# Patient Record
Sex: FEMALE | Race: WHITE | NOT HISPANIC OR LATINO | Employment: FULL TIME | URBAN - METROPOLITAN AREA
[De-identification: names, ages, dates, MRNs, and addresses within clinical notes are randomized per-mention and may not be internally consistent; named-entity substitution may affect disease eponyms.]

---

## 2017-11-21 ENCOUNTER — OFFICE VISIT (OUTPATIENT)
Dept: ORTHOPEDIC SURGERY | Facility: CLINIC | Age: 57
End: 2017-11-21

## 2017-11-21 VITALS
HEIGHT: 63 IN | WEIGHT: 134 LBS | SYSTOLIC BLOOD PRESSURE: 136 MMHG | DIASTOLIC BLOOD PRESSURE: 81 MMHG | HEART RATE: 72 BPM | BODY MASS INDEX: 23.74 KG/M2

## 2017-11-21 DIAGNOSIS — Z96.653 STATUS POST TOTAL BILATERAL KNEE REPLACEMENT: Primary | ICD-10-CM

## 2017-11-21 PROCEDURE — 99204 OFFICE O/P NEW MOD 45 MIN: CPT | Performed by: ORTHOPAEDIC SURGERY

## 2017-11-21 NOTE — PROGRESS NOTES
Orthopaedic Clinic Note: Knee New Patient    Chief Complaint   Patient presents with   • Right Knee - Establish Care     (R) TKA 02/2013- Dr Prater   • Left Knee - Establish Care     (L) TKA 10/2013- Dr Prater        YANY Ryan is a 56 y.o. female who presents To [facility] care.  She is status post bilateral total knee arthroplasties performed in 2013.  She is doing well and denies any interval complaints.  She rates her pain 0/10 on the pain scale.  She is able to perform all daily activities without any significant limitations.  She is ambulating with no assistive device today.  He is here simply for a checkup to ensure there were no complications associated with her knee arthroplasty.      Past Medical History:   Diagnosis Date   • Osteoarthritis       Past Surgical History:   Procedure Laterality Date   • APPENDECTOMY     • BREAST LUMPECTOMY Right    • HYSTERECTOMY     • JOINT REPLACEMENT Bilateral     TKA   • NASAL SEPTUM SURGERY     • WRIST SURGERY Left       Family History   Problem Relation Age of Onset   • Cancer Mother    • Osteoarthritis Mother    • Hypertension Mother    • Diabetes Mother    • Stroke Father    • Osteoarthritis Father    • Hypertension Father      Social History     Social History   • Marital status: Unknown     Spouse name: N/A   • Number of children: N/A   • Years of education: N/A     Occupational History   • Not on file.     Social History Main Topics   • Smoking status: Never Smoker   • Smokeless tobacco: Never Used   • Alcohol use No   • Drug use: No   • Sexual activity: Defer     Other Topics Concern   • Not on file     Social History Narrative   • No narrative on file      No current outpatient prescriptions on file prior to visit.     No current facility-administered medications on file prior to visit.       Allergies   Allergen Reactions   • Penicillins    • Sulfa Antibiotics         Review of Systems   Constitutional: Negative.    HENT: Positive for sinus pressure and  "sneezing.    Eyes: Negative.    Respiratory: Negative.    Cardiovascular: Negative.    Gastrointestinal: Negative.    Endocrine: Negative.    Genitourinary: Negative.    Musculoskeletal: Positive for arthralgias and back pain.   Skin: Negative.    Allergic/Immunologic: Positive for environmental allergies.   Neurological: Negative.    Hematological: Negative.    Psychiatric/Behavioral: Negative.         The following portions of the patient's history were reviewed and updated as appropriate: allergies, current medications, past family history, past medical history, past social history, past surgical history and problem list.    Physical Exam  Blood pressure 136/81, pulse 72, height 63\" (160 cm), weight 134 lb (60.8 kg).    Body mass index is 23.74 kg/(m^2).    GENERAL APPEARANCE: awake, alert & oriented x 3, in no acute distress and well developed, well nourished  PSYCH: normal affect  LUNGS:  breathing nonlabored  EYES: sclera anicteric  CARDIOVASCULAR: palpable dorsalis pedis, palpable posterior tibial bilaterally. Capillary refill less than 2 seconds  EXTREMITIES: no clubbing, cyanosis  GAIT:  Normal            Right Lower Extremity Exam:   ----------  Hip Exam  ----------  FLEXION CONTRACTURE: None  FLEXION: 110 degrees  INTERNAL ROTATION: 20 degrees at 90 degrees of flexion   EXTERNAL ROTATION: 40 degrees at 90 degrees of flexion    PAIN WITH HIP MOTION: no  ----------  Knee Exam  ----------  ALIGNMENT: neutral, no varus or valgus deformity     RANGE OF MOTION:  Normal (0-130 degrees) with no extensor lag or flexion contracture  LIGAMENTOUS STABILITY:   stable to varus and valgus stress at 0 and 30 degrees without any evidence of laxity     STRENGTH:  5/5 knee flexion, extension. 5/5 ankle dorsiflexion and plantarflexion.     PAIN WITH PALPATION: denies tenderness to palpation about the knee, denies medial or lateral joint line pain  KNEE EFFUSION: no  PAIN WITH KNEE ROM: no  PATELLAR CREPITUS: no  SPECIAL EXAM " FINDINGS:  Negative patellar compression    REFLEXES:  PATELLAR 2+/4  ACHILLES 2+/4    CLONUS: negative  STRAIGHT LEG TEST:   negative    SENSATION TO LIGHT TOUCH:  DEEP PERONEAL/SUPERFICIAL PERONEAL/SURAL/SAPHENOUS/TIBIAL:   intact    EDEMA:  no  ERYTHEMA:  no  WOUNDS/INCISIONS: Well-healed anterior midline knee incision, no overlying skin problems.      Left Lower Extremity Exam:   ----------  Hip Exam  ----------  FLEXION CONTRACTURE: None  FLEXION: 110 degrees  INTERNAL ROTATION: 20 degrees at 90 degrees of flexion   EXTERNAL ROTATION: 40 degrees at 90 degrees of flexion    PAIN WITH HIP MOTION: no  ----------  Knee Exam  ----------  ALIGNMENT: neutral, no varus or valgus deformity     RANGE OF MOTION:  Normal (0-130 degrees) with no extensor lag or flexion contracture  LIGAMENTOUS STABILITY:   stable to varus and valgus stress at 0 and 30 degrees without any evidence of laxity     STRENGTH:  5/5 knee flexion, extension. 5/5 ankle dorsiflexion and plantarflexion.     PAIN WITH PALPATION: denies tenderness to palpation about the knee, denies medial or lateral joint line pain  KNEE EFFUSION: no  PAIN WITH KNEE ROM: no  PATELLAR CREPITUS: no  SPECIAL EXAM FINDINGS:  Negative patellar compression    REFLEXES:  PATELLAR 2+/4  ACHILLES 2+/4    CLONUS: negative  STRAIGHT LEG TEST:   negative    SENSATION TO LIGHT TOUCH:  DEEP PERONEAL/SUPERFICIAL PERONEAL/SURAL/SAPHENOUS/TIBIAL:   intact    EDEMA:  no  ERYTHEMA:  no  WOUNDS/INCISIONS: Well-healed anterior midline incision, no overlying skin problems.    ______________________________________________________________________  ______________________________________________________________________    RADIOGRAPHIC FINDINGS:   Indication: Status post bilateral total knee arthroplasties    Comparison: Todays xrays were compared to previous xrays from 11/25/14     Right knee(s) 2 views: Demonstrate well positioned knee arthroplasty components in satisfactory alignment without  evidence of wear, loosening, subsidence, fracture, or osteolysis and No significant changes compared to prior radiographs.  Left knee(s) 2 views: Demonstrate well positioned knee arthroplasty components in satisfactory alignment without evidence of wear, loosening, subsidence, fracture, or osteolysis and No significant changes compared to prior radiographs.    Assessment/Plan:   Diagnosis Plan   1. Status post total bilateral knee replacement  XR Knee 1 or 2 View Bilateral     Patient is doing well 4+ year status post bilateral total knee arthroplasties.  I recommended continued activity as tolerated without restrictions.  I'll see her back in 2 years with repeat x-ray AP and lateral views bilateral knees.    ASHLEY Rosen(R)  11/21/17  10:15 AM

## 2019-09-24 ENCOUNTER — APPOINTMENT (OUTPATIENT)
Dept: WOMENS IMAGING | Facility: HOSPITAL | Age: 59
End: 2019-09-24

## 2019-09-24 PROCEDURE — 77067 SCR MAMMO BI INCL CAD: CPT | Performed by: RADIOLOGY

## 2020-07-16 ENCOUNTER — OFFICE VISIT (OUTPATIENT)
Dept: ORTHOPEDIC SURGERY | Facility: CLINIC | Age: 60
End: 2020-07-16

## 2020-07-16 VITALS — OXYGEN SATURATION: 99 % | HEART RATE: 84 BPM | BODY MASS INDEX: 23.75 KG/M2 | HEIGHT: 63 IN | WEIGHT: 134.04 LBS

## 2020-07-16 DIAGNOSIS — Z96.652 S/P TKR (TOTAL KNEE REPLACEMENT), LEFT: Primary | ICD-10-CM

## 2020-07-16 DIAGNOSIS — Z96.651 S/P TKR (TOTAL KNEE REPLACEMENT), RIGHT: ICD-10-CM

## 2020-07-16 PROCEDURE — 99213 OFFICE O/P EST LOW 20 MIN: CPT | Performed by: ORTHOPAEDIC SURGERY

## 2020-07-16 NOTE — PROGRESS NOTES
Orthopaedic Clinic Note: Knee Established Patient    Chief Complaint   Patient presents with   • Follow-up     2.5 years- s/p (R) TKA 02/2013, (L) TKA 10/2013        HPI    It has been 2.5  year(s) since Ms. Ryan's last visit. She returns to clinic today for follow-up bilateral total knee arthroplasties.  She is approximately 7 years out from surgery of both knees. She rates her pain a 1/10 on the pain scale and is currently taking nothing for pain. She is ambulating with no assistive device. She has completed therapy.  She denies fevers, chills, or constitutional symptoms.  Overall, she is doing well.  She denies complications.    Past Medical History:   Diagnosis Date   • Osteoarthritis       Past Surgical History:   Procedure Laterality Date   • APPENDECTOMY     • BREAST LUMPECTOMY Right    • HYSTERECTOMY     • JOINT REPLACEMENT Bilateral     TKA   • NASAL SEPTUM SURGERY     • WRIST SURGERY Left       Family History   Problem Relation Age of Onset   • Cancer Mother    • Osteoarthritis Mother    • Hypertension Mother    • Diabetes Mother    • Stroke Father    • Osteoarthritis Father    • Hypertension Father      Social History     Socioeconomic History   • Marital status: Unknown     Spouse name: Not on file   • Number of children: Not on file   • Years of education: Not on file   • Highest education level: Not on file   Tobacco Use   • Smoking status: Never Smoker   • Smokeless tobacco: Never Used   Substance and Sexual Activity   • Alcohol use: No   • Drug use: No   • Sexual activity: Defer      Current Outpatient Medications on File Prior to Visit   Medication Sig Dispense Refill   • Loratadine (CLARITIN PO) Take  by mouth.     • MULTIPLE VITAMIN PO Take  by mouth.       No current facility-administered medications on file prior to visit.       Allergies   Allergen Reactions   • Penicillins    • Sulfa Antibiotics         Review of Systems   Constitutional: Negative.    HENT: Negative.    Eyes: Negative.   "  Respiratory: Negative.    Cardiovascular: Negative.    Gastrointestinal: Negative.    Endocrine: Negative.    Genitourinary: Negative.    Musculoskeletal: Positive for arthralgias.   Skin: Negative.    Allergic/Immunologic: Negative.    Neurological: Negative.    Hematological: Negative.    Psychiatric/Behavioral: Negative.         The patient's Review of Systems was personally reviewed and confirmed as accurate.    Physical Exam  Pulse 84, height 160 cm (62.99\"), weight 60.8 kg (134 lb 0.6 oz), SpO2 99 %.    Body mass index is 23.75 kg/m².    GENERAL APPEARANCE: awake, alert, oriented, in no acute distress and well developed, well nourished  LUNGS:  breathing nonlabored  EXTREMITIES: no clubbing, cyanosis  PERIPHERAL PULSES: palpable dorsalis pedis and posterior tibial pulses bilaterally.    GAIT:  Normal        ----------  Bilateral Knee Exam:  ----------  ALIGNMENT: neutral  ----------  RANGE OF MOTION:  Normal (0-120 degrees) with no extensor lag or flexion contracture  LIGAMENTOUS STABILITY:   stable to varus and valgus stress at terminal extension and 30 degrees without any evidence of laxity  ----------  STRENGTH:  KNEE FLEXION 5/5  KNEE EXTENSION  5/5  ANKLE DORSIFLEXION  5/5  ANKLE PLANTARFLEXION  5/5  ----------  PAIN WITH PALPATION:denies tenderness to palpation about the knee  KNEE EFFUSION: no  PAIN WITH KNEE ROM: no  PATELLAR CREPITUS:  no  ----------  SENSATION TO LIGHT TOUCH:  DEEP PERONEAL/SUPERFICIAL PERONEAL/SURAL/SAPHENOUS/TIBIAL:    intact  ----------  EDEMA:  no  ERYTHEMA:    no  WOUNDS/INCISIONS:   yes, well healed surgical incision without evidence of erythema or drainage  _____________________________________________________________________  _____________________________________________________________________    RADIOGRAPHIC FINDINGS:   Indication: Status post bilateral total knee arthroplasty    Comparison: Todays xrays were compared to previous xrays from 11/21/2017    Knee films: " Demonstrate well positioned knee arthroplasty components in satisfactory alignment without evidence of wear, loosening, subsidence, fracture, or osteolysis and No significant changes compared to prior radiographs.    Assessment/Plan:   Diagnosis Plan   1. S/P TKR (total knee replacement), left  XR Knee 3 View Bilateral   2. S/P TKR (total knee replacement), right  XR Knee 3 View Bilateral     Patient is doing well status post bilateral total knee arthroplasties.  Her occasional discomfort is due to seasonal changes with occasional aching in the knees.  She states it is not limiting daily activities.  I recommended over-the-counter anti-inflammatories as needed.  I will see her back in 5 years for repeat evaluation of both knees with x-ray 3 views both knees on return.  She is welcome to follow-up sooner should problems arise.      Roshni Peña, [facility]  07/16/20  09:35

## 2022-04-14 ENCOUNTER — OFFICE VISIT (OUTPATIENT)
Dept: ORTHOPEDIC SURGERY | Facility: CLINIC | Age: 62
End: 2022-04-14

## 2022-04-14 VITALS
BODY MASS INDEX: 23.57 KG/M2 | WEIGHT: 133 LBS | DIASTOLIC BLOOD PRESSURE: 82 MMHG | HEIGHT: 63 IN | SYSTOLIC BLOOD PRESSURE: 128 MMHG

## 2022-04-14 DIAGNOSIS — Z96.652 STATUS POST TOTAL LEFT KNEE REPLACEMENT: ICD-10-CM

## 2022-04-14 DIAGNOSIS — M25.562 LEFT KNEE PAIN, UNSPECIFIED CHRONICITY: ICD-10-CM

## 2022-04-14 DIAGNOSIS — S86.912A KNEE STRAIN, LEFT, INITIAL ENCOUNTER: Primary | ICD-10-CM

## 2022-04-14 PROCEDURE — 99213 OFFICE O/P EST LOW 20 MIN: CPT | Performed by: ORTHOPAEDIC SURGERY

## 2022-04-14 RX ORDER — CETIRIZINE HYDROCHLORIDE 10 MG/1
10 TABLET ORAL DAILY
COMMUNITY

## 2022-04-14 NOTE — PROGRESS NOTES
Orthopaedic Clinic Note: Knee Established Patient    Chief Complaint   Patient presents with   • Left Knee - Pain     Fall DOI: 4/9/22        HPI    Annemarie Ryan is a 61 y.o. female who presents with new problem of: left knee pain.  Onset: mechanical fall.  She is status post total knee arthroplasty by Dr. Prater in 2013.  The issue has been ongoing for 6 day(s). Pain is a 3/10 on the pain scale. Pain is described as aching. Associated symptoms include pain, swelling and stiffness. The pain is worse with walking, standing, climbing stairs and rising from seated position; elevating the extremity improve the pain. Previous treatments have included: nothing.    I have reviewed the following portions of the patient's history:History of Present Illness    Past Medical History:   Diagnosis Date   • Osteoarthritis       Past Surgical History:   Procedure Laterality Date   • APPENDECTOMY     • BREAST LUMPECTOMY Right    • HYSTERECTOMY     • JOINT REPLACEMENT Bilateral     TKA   • NASAL SEPTUM SURGERY     • WRIST SURGERY Left       Family History   Problem Relation Age of Onset   • Cancer Mother    • Osteoarthritis Mother    • Hypertension Mother    • Diabetes Mother    • Stroke Father    • Osteoarthritis Father    • Hypertension Father      Social History     Socioeconomic History   • Marital status: Unknown   Tobacco Use   • Smoking status: Never Smoker   • Smokeless tobacco: Never Used   Substance and Sexual Activity   • Alcohol use: No   • Drug use: No   • Sexual activity: Defer      Current Outpatient Medications on File Prior to Visit   Medication Sig Dispense Refill   • CALCIUM PO Take  by mouth.     • cetirizine (zyrTEC) 10 MG tablet Take 10 mg by mouth Daily.     • MULTIPLE VITAMIN PO Take  by mouth.     • [DISCONTINUED] Loratadine (CLARITIN PO) Take  by mouth.       No current facility-administered medications on file prior to visit.      Allergies   Allergen Reactions   • Penicillins    • Sulfa Antibiotics      "    Review of Systems   Constitutional: Positive for activity change.   HENT: Negative.    Eyes: Negative.    Respiratory: Negative.    Cardiovascular: Negative.    Gastrointestinal: Negative.    Endocrine: Negative.    Genitourinary: Negative.    Musculoskeletal: Positive for arthralgias, gait problem and joint swelling.   Skin: Negative.    Allergic/Immunologic: Positive for environmental allergies.   Hematological: Negative.    Psychiatric/Behavioral: Negative.         The patient's Review of Systems was personally reviewed and confirmed as accurate.    Physical Exam  Blood pressure 128/82, height 160 cm (62.99\"), weight 60.3 kg (133 lb).    Body mass index is 23.57 kg/m².    GENERAL APPEARANCE: awake, alert, oriented, in no acute distress and well developed, well nourished  LUNGS:  breathing nonlabored  EXTREMITIES: no clubbing, cyanosis  PERIPHERAL PULSES: palpable dorsalis pedis and posterior tibial pulses bilaterally.    GAIT:  Normal        ----------  Left Knee Exam:  ----------  ALIGNMENT: neutral  ----------  RANGE OF MOTION:  Normal (0-120 degrees) with no extensor lag or flexion contracture  LIGAMENTOUS STABILITY:   stable to varus and valgus stress at terminal extension and 30 degrees without any evidence of laxity  ----------  STRENGTH:  KNEE FLEXION 5/5  KNEE EXTENSION  5/5  ANKLE DORSIFLEXION  5/5  ANKLE PLANTARFLEXION  5/5  ----------  PAIN WITH PALPATION:medial joint line and anterior knee  KNEE EFFUSION: yes, trace effusion  PAIN WITH KNEE ROM: no  PATELLAR CREPITUS:  no  ----------  SENSATION TO LIGHT TOUCH:  DEEP PERONEAL/SUPERFICIAL PERONEAL/SURAL/SAPHENOUS/TIBIAL:    intact  ----------  EDEMA:  no  ERYTHEMA:    no  WOUNDS/INCISIONS:   no  _____________________________________________________________________  _____________________________________________________________________    RADIOGRAPHIC FINDINGS:   Indication: Left knee pain    Comparison: Todays xrays were compared to previous xrays " from 7/16/2020    Knee films: Demonstrate well positioned knee arthroplasty components in satisfactory alignment without evidence of wear, loosening, subsidence, fracture, or osteolysis and No significant changes compared to prior radiographs.    Assessment/Plan:   Diagnosis Plan   1. Knee strain, left, initial encounter     2. Left knee pain, unspecified chronicity  XR Knee 3+ View With [place]christ Left   3. Status post total left knee replacement       I discussed with this patient I see no clinical or radiographic evidence of complication from her knee replacement.  I believe the fall resulted in soft tissue strain with no acute bony injury or mechanical complications with a knee replacement.  I recommended conservative treatment with over-the-counter anti-inflammatories, ice, elevation.  She is agreeable to this plan.  If her symptoms fail to improve within the next couple of weeks, encouraged her to call the office for follow-up.  Otherwise she can follow-up as needed.      Edenilson Culp MD  04/14/22  11:09 EDT

## 2023-09-21 ENCOUNTER — TRANSCRIBE ORDERS (OUTPATIENT)
Dept: ADMINISTRATIVE | Facility: HOSPITAL | Age: 63
End: 2023-09-21
Payer: COMMERCIAL

## 2023-09-21 DIAGNOSIS — E78.5 HYPERLIPIDEMIA, UNSPECIFIED HYPERLIPIDEMIA TYPE: Primary | ICD-10-CM

## 2023-09-29 ENCOUNTER — HOSPITAL ENCOUNTER (OUTPATIENT)
Dept: CT IMAGING | Facility: HOSPITAL | Age: 63
Discharge: HOME | End: 2023-09-29
Admitting: INTERNAL MEDICINE

## 2023-09-29 DIAGNOSIS — E78.5 HYPERLIPIDEMIA, UNSPECIFIED HYPERLIPIDEMIA TYPE: ICD-10-CM

## 2023-09-29 PROCEDURE — 75571 CT HRT W/O DYE W/CA TEST: CPT

## 2025-06-24 ENCOUNTER — OFFICE VISIT (OUTPATIENT)
Dept: ORTHOPEDIC SURGERY | Facility: CLINIC | Age: 65
End: 2025-06-24
Payer: COMMERCIAL

## 2025-06-24 VITALS
BODY MASS INDEX: 22.64 KG/M2 | HEIGHT: 63 IN | DIASTOLIC BLOOD PRESSURE: 70 MMHG | SYSTOLIC BLOOD PRESSURE: 126 MMHG | WEIGHT: 127.8 LBS

## 2025-06-24 DIAGNOSIS — M17.10 PATELLOFEMORAL ARTHRITIS: Primary | ICD-10-CM

## 2025-06-24 DIAGNOSIS — Z96.653 HISTORY OF BILATERAL KNEE ARTHROPLASTY: ICD-10-CM

## 2025-06-24 PROCEDURE — 99204 OFFICE O/P NEW MOD 45 MIN: CPT | Performed by: ORTHOPAEDIC SURGERY

## 2025-06-24 RX ORDER — MELOXICAM 15 MG/1
TABLET ORAL
Qty: 90 TABLET | Refills: 1 | Status: SHIPPED | OUTPATIENT
Start: 2025-06-24

## 2025-06-24 NOTE — PROGRESS NOTES
Orthopaedic Clinic Note: Knee New Patient    Chief Complaint   Patient presents with    Right Knee - Pain     Right knee arthroplasty 2/2013    Left Knee - Pain     Left knee arthroplasty 10/2013        YANY Ryan is a 64 y.o. female who presents with bilateral knee pain for 1 year on the right and 1 month for the left. Onset atraumatic and gradual in nature. Pain is localized to the anterior knee and is a 4/10 on the pain scale. Pain is described as dull and aching. Associated symptoms include pain, popping, stiffness, and tingling. The pain is worse with climbing stairs, leisure, and uneven ground; resting make it better. Previous treatments have included: nothing .. Although some transient relief was reported with these interventions, these conservative measures have failed and symptoms have persisted. The patient is limited in daily activities and has had a significant decrease in quality of life as a result. She denies fevers, chills, or constitutional symptoms.    I have reviewed the following portions of the patient's history:History of Present Illness    Past Medical History:   Diagnosis Date    Arthritis of back     CTS (carpal tunnel syndrome)     Fracture of wrist     Left    Knee swelling     Low back strain     Neck strain     Osteoarthritis     Tear of meniscus of knee       Past Surgical History:   Procedure Laterality Date    APPENDECTOMY      BREAST LUMPECTOMY Right     HYSTERECTOMY      JOINT REPLACEMENT Bilateral     TKA    NASAL SEPTUM SURGERY      WRIST SURGERY Left       Family History   Problem Relation Age of Onset    Cancer Mother     Osteoarthritis Mother     Hypertension Mother     Diabetes Mother     Broken bones Mother     Osteoporosis Mother     Stroke Father     Osteoarthritis Father     Hypertension Father      Social History     Socioeconomic History    Marital status: Unknown   Tobacco Use    Smoking status: Never    Smokeless tobacco: Never   Substance and Sexual Activity     Unfortunately, I do not have any availability and already have over my max of new pt's scheduled for tomorrow. I will keep this note in my basket in case any of my pt's cancel. "Alcohol use: No    Drug use: No    Sexual activity: Yes     Partners: Male     Birth control/protection: Hysterectomy      Current Outpatient Medications on File Prior to Visit   Medication Sig Dispense Refill    CALCIUM PO Take  by mouth.      cetirizine (zyrTEC) 10 MG tablet Take 1 tablet by mouth Daily.      MULTIPLE VITAMIN PO Take  by mouth.       No current facility-administered medications on file prior to visit.      Allergies   Allergen Reactions    Penicillins     Sulfa Antibiotics         Review of Systems   Constitutional: Negative.    HENT: Negative.     Eyes: Negative.    Respiratory: Negative.     Cardiovascular: Negative.    Gastrointestinal: Negative.    Endocrine: Negative.    Genitourinary: Negative.    Musculoskeletal:  Positive for arthralgias.   Skin: Negative.    Allergic/Immunologic: Negative.    Neurological: Negative.    Hematological: Negative.    Psychiatric/Behavioral: Negative.          The patient's Review of Systems was personally reviewed and confirmed as accurate.    The following portions of the patient's history were reviewed and updated as appropriate: allergies, current medications, past family history, past medical history, past social history, past surgical history, and problem list.    Physical Exam  Blood pressure 126/70, height 160 cm (63\"), weight 58 kg (127 lb 12.8 oz).    Body mass index is 22.64 kg/m².    GENERAL APPEARANCE: awake, alert & oriented x 3, in no acute distress and well developed, well nourished  PSYCH: normal affect  LUNGS:  breathing nonlabored  EYES: sclera anicteric  CARDIOVASCULAR: palpable dorsalis pedis, palpable posterior tibial bilaterally. Capillary refill less than 2 seconds  EXTREMITIES: no clubbing, cyanosis  GAIT:  Normal            Bilateral lower Extremity Exam:   ----------  Hip Exam  ----------  FLEXION CONTRACTURE: None  FLEXION: 110 degrees  INTERNAL ROTATION: 20 degrees at 90 degrees of flexion   EXTERNAL ROTATION: 40 degrees at 90 " degrees of flexion    PAIN WITH HIP MOTION: no  ----------  Knee Exam  ----------  ALIGNMENT: neutral, no varus or valgus deformity     RANGE OF MOTION:  Normal (0-120 degrees) with no extensor lag or flexion contracture  LIGAMENTOUS STABILITY:   stable to varus and valgus stress at terminal extension and 30 degrees without any evidence of laxity     STRENGTH:  5/5 knee flexion, extension. 5/5 ankle dorsiflexion and plantarflexion.     PAIN WITH PALPATION: denies tenderness to palpation about the knee, denies medial or lateral joint line pain  KNEE EFFUSION: no  PAIN WITH KNEE ROM: no  PATELLAR CREPITUS: Yes  SPECIAL EXAM FINDINGS:  Negative patellar compression    REFLEXES:  PATELLAR 2+/4  ACHILLES 2+/4    CLONUS: negative  STRAIGHT LEG TEST:   negative    SENSATION TO LIGHT TOUCH:  DEEP PERONEAL/SUPERFICIAL PERONEAL/SURAL/SAPHENOUS/TIBIAL:   intact    EDEMA:  no  ERYTHEMA:  no  WOUNDS/INCISIONS: Well healed anterior knee incision    ______________________________________________________________________  ______________________________________________________________________    RADIOGRAPHIC FINDINGS:   Indication: Bilateral knee pain    Comparison: Todays xrays were compared to previous xrays from 4/14/2022 and 7/6/2020    Bilateral knee(s) 4 views: Demonstrate well positioned knee arthroplasty components in satisfactory alignment without evidence of wear, loosening, subsidence, fracture, or osteolysis.  There is some slight joint space narrowing of the patellofemoral compartments bilaterally.      Assessment/Plan:   Diagnosis Plan   1. Patellofemoral arthritis  meloxicam (MOBIC) 15 MG tablet      2. History of bilateral knee arthroplasty  XR Knee 3 View Bilateral        Patient is suffering from some mild arthritis involving the patellofemoral joint.  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  I discussed that her pain is likely coming from the unresurfaced patella and some mild  arthritic changes of the patellar joints.  Structurally, her knee replacements are functioning well with no clinical or radiographic evidence of complication.  She is agreeable to prescription anti-inflammatory as well as a home exercise program for quadricep strengthening.  She will follow-up with me as needed.    Edenilson Culp MD  06/24/25  13:56 EDT